# Patient Record
Sex: MALE | Race: WHITE | NOT HISPANIC OR LATINO | URBAN - METROPOLITAN AREA
[De-identification: names, ages, dates, MRNs, and addresses within clinical notes are randomized per-mention and may not be internally consistent; named-entity substitution may affect disease eponyms.]

---

## 2023-03-02 ENCOUNTER — OFFICE VISIT (OUTPATIENT)
Dept: URGENT CARE | Facility: CLINIC | Age: 58
End: 2023-03-02

## 2023-03-02 VITALS
TEMPERATURE: 98 F | WEIGHT: 200 LBS | OXYGEN SATURATION: 95 % | HEART RATE: 94 BPM | SYSTOLIC BLOOD PRESSURE: 137 MMHG | BODY MASS INDEX: 28 KG/M2 | DIASTOLIC BLOOD PRESSURE: 95 MMHG | RESPIRATION RATE: 18 BRPM | HEIGHT: 71 IN

## 2023-03-02 DIAGNOSIS — S61.319S: Primary | ICD-10-CM

## 2023-03-02 RX ORDER — CEPHALEXIN 500 MG/1
500 CAPSULE ORAL EVERY 12 HOURS SCHEDULED
Qty: 14 CAPSULE | Refills: 0 | Status: SHIPPED | OUTPATIENT
Start: 2023-03-02 | End: 2023-03-09

## 2023-03-02 NOTE — PROGRESS NOTES
[facility]        NAME: Nallely Gray is a 66 y o  male  : 1965    MRN: [id number]  DATE: 2023  TIME: 4:41 PM    Assessment and Plan   Laceration of finger of left hand with damage to nail, foreign body presence unspecified, unspecified finger, sequela [S61 319S]  1  Laceration of finger of left hand with damage to nail, foreign body presence unspecified, unspecified finger, sequela  Tdap Vaccine greater than or equal to 8yo    cephalexin (KEFLEX) 500 mg capsule        Laceration repair    Date/Time: 3/2/2023 4:40 PM  Performed by: BONILLA Vitale  Authorized by: BONILLA Vitale   Consent given by: patient  Body area: upper extremity  Location details: left small finger  Laceration length: 2 cm  Foreign bodies: no foreign bodies  Tendon involvement: none  Nerve involvement: none  Anesthesia: local infiltration    Anesthesia:  Local Anesthetic: lidocaine 1% without epinephrine      Procedure Details:  Irrigation solution: saline  Amount of cleaning: standard  Fascia closure: 5-0 Vicryl  Number of sutures: 6  Technique: simple  Approximation: close  Approximation difficulty: simple  Dressing: antibiotic ointment and splint  Patient tolerance: patient tolerated the procedure well with no immediate complications            Patient Instructions     Keep the wound clean and dry  Apply a thin layer of bacitracin   Change the dressing daily  Keep the splint in place to prevent movement  Take the antibiotics with food  The stitches needs to come out in 7-10 days  Follow up with PCP in 3-5 days  Proceed to  ER if symptoms worsen  Chief Complaint     Chief Complaint   Patient presents with   • Laceration     Sheet metal laceration on left fifth finger about 1:50 today; last tetanus unknown         History of Present Illness       Laceration   The incident occurred 1 to 3 hours ago  The laceration is located on the left hand  The laceration is 2 cm in size   The laceration mechanism was a metal edge  The patient is experiencing no pain  He reports no foreign bodies present  His tetanus status is out of date  Review of Systems   Review of Systems   Skin: Positive for wound  All other systems reviewed and are negative  Current Medications       Current Outpatient Medications:   •  cephalexin (KEFLEX) 500 mg capsule, Take 1 capsule (500 mg total) by mouth every 12 (twelve) hours for 7 days, Disp: 14 capsule, Rfl: 0    Current Allergies     Allergies as of 03/02/2023   • (No Known Allergies)            The following portions of the patient's history were reviewed and updated as appropriate: allergies, current medications, past family history, past medical history, past social history, past surgical history and problem list      History reviewed  No pertinent past medical history  Past Surgical History:   Procedure Laterality Date   • HERNIA REPAIR         Family History   Problem Relation Age of Onset   • No Known Problems Mother    • No Known Problems Father          Medications have been verified  Objective   /95   Pulse 94   Temp 98 °F (36 7 °C)   Resp 18   Ht 5' 11" (1 803 m)   Wt 90 7 kg (200 lb)   SpO2 95%   BMI 27 89 kg/m²        Physical Exam     Physical Exam  Vitals and nursing note reviewed  Constitutional:       General: He is not in acute distress  Appearance: Normal appearance  He is normal weight  He is not ill-appearing or toxic-appearing  Skin:     Findings: Laceration present  Neurological:      Mental Status: He is alert

## 2023-03-02 NOTE — PATIENT INSTRUCTIONS
Keep the wound clean and dry  Apply a thin layer of bacitracin   Change the dressing daily  Keep the splint in place to prevent movement  Take the antibiotics with food  The stitches needs to come out in 7-10 days

## 2023-03-11 ENCOUNTER — OFFICE VISIT (OUTPATIENT)
Dept: URGENT CARE | Facility: CLINIC | Age: 58
End: 2023-03-11

## 2023-03-11 VITALS
OXYGEN SATURATION: 98 % | HEIGHT: 71 IN | SYSTOLIC BLOOD PRESSURE: 132 MMHG | TEMPERATURE: 97.1 F | RESPIRATION RATE: 16 BRPM | DIASTOLIC BLOOD PRESSURE: 78 MMHG | HEART RATE: 64 BPM | WEIGHT: 200 LBS | BODY MASS INDEX: 28 KG/M2

## 2023-03-11 DIAGNOSIS — Z48.02 ENCOUNTER FOR REMOVAL OF SUTURES: Primary | ICD-10-CM

## 2023-03-11 NOTE — PROGRESS NOTES
[facility]        NAME: Austyn Martines is a 66 y o  male  : 1965    MRN: [id number]  DATE: 2023  TIME: 8:17 AM    Assessment and Plan   Encounter for removal of sutures [Z48 02]  1  Encounter for removal of sutures              Patient Instructions   Left fifth finger laceration-6 sutures removed  Wound will have to heal from the bottom  Apply topical antibiotic cream or ointment 1-2 times daily  May keep open to air at home but cover with a Band-Aid if going out  Call or follow-up if there is any new redness, swelling, pus or bleeding  May wash wound with soap and water at this time but avoid soaking in water for too long  Follow up with PCP in 3-5 days  Proceed to  ER if symptoms worsen  Chief Complaint     Chief Complaint   Patient presents with   • Suture / Staple Removal     Suture removal- day 9          History of Present Illness       Patient presents for suture removal from left fifth finger laceration  Wound was repaired 9 days ago  Wound is healing well, no redness, no pain or drainage  No weakness or numbness  Review of Systems   Review of Systems   Skin: Positive for wound  Neurological: Negative for weakness and numbness  Current Medications     No current outpatient medications on file  Current Allergies     Allergies as of 2023   • (No Known Allergies)            The following portions of the patient's history were reviewed and updated as appropriate: allergies, current medications, past family history, past medical history, past social history, past surgical history and problem list      History reviewed  No pertinent past medical history  Past Surgical History:   Procedure Laterality Date   • HERNIA REPAIR         Family History   Problem Relation Age of Onset   • No Known Problems Mother    • No Known Problems Father          Medications have been verified          Objective   /78   Pulse 64   Temp (!) 97 1 °F (36 2 °C) Resp 16   Ht 5' 11" (1 803 m)   Wt 90 7 kg (200 lb)   SpO2 98%   BMI 27 89 kg/m²   No LMP for male patient  Physical Exam     Physical Exam  Constitutional:       Appearance: Normal appearance  Skin:     Comments: Left fifth finger distal phalanx with laceration with 6 sutures intact  Wound edges are not well approximated  No erythema, no drainage  No tenderness  There is some maceration  No purulent discharge  Full range of motion about the finger, normal distal sensation and perfusion  Neurological:      Mental Status: He is alert  Suture removal    Date/Time: 3/12/2023 8:16 AM  Performed by: Geovanni Luna MD  Authorized by: Geovanni Luna MD   Universal Protocol:  Consent: Verbal consent obtained  Consent given by: patient  Patient identity confirmed: verbally with patient        Patient location:  Clinic  Location:     Laterality:  Left    Location:  Upper extremity    Upper extremity location:  Hand    Hand location:  L small finger  Procedure details: Tools used:  Suture removal kit    Wound appearance:  No sign(s) of infection, nontender, clean and moist    Number of sutures removed:  6  Post-procedure details:     Post-removal:  Antibiotic ointment applied and Band-Aid applied    Patient tolerance of procedure:   Tolerated well, no immediate complications